# Patient Record
Sex: FEMALE | Race: WHITE | NOT HISPANIC OR LATINO | Employment: FULL TIME | URBAN - NONMETROPOLITAN AREA
[De-identification: names, ages, dates, MRNs, and addresses within clinical notes are randomized per-mention and may not be internally consistent; named-entity substitution may affect disease eponyms.]

---

## 2017-03-31 ENCOUNTER — OFFICE VISIT - GICH (OUTPATIENT)
Dept: FAMILY MEDICINE | Facility: OTHER | Age: 22
End: 2017-03-31

## 2017-03-31 ENCOUNTER — HISTORY (OUTPATIENT)
Dept: FAMILY MEDICINE | Facility: OTHER | Age: 22
End: 2017-03-31

## 2017-03-31 DIAGNOSIS — J32.9 CHRONIC SINUSITIS: ICD-10-CM

## 2017-03-31 DIAGNOSIS — J02.9 ACUTE PHARYNGITIS: ICD-10-CM

## 2017-03-31 DIAGNOSIS — B97.89 OTHER VIRAL AGENTS AS THE CAUSE OF DISEASES CLASSIFIED ELSEWHERE: ICD-10-CM

## 2017-03-31 DIAGNOSIS — J04.0 ACUTE LARYNGITIS: ICD-10-CM

## 2017-03-31 LAB — STREP A ANTIGEN - HISTORICAL: NEGATIVE

## 2017-06-02 ENCOUNTER — OFFICE VISIT - GICH (OUTPATIENT)
Dept: FAMILY MEDICINE | Facility: OTHER | Age: 22
End: 2017-06-02

## 2017-06-02 ENCOUNTER — HISTORY (OUTPATIENT)
Dept: FAMILY MEDICINE | Facility: OTHER | Age: 22
End: 2017-06-02

## 2017-06-02 DIAGNOSIS — R30.0 DYSURIA: ICD-10-CM

## 2017-06-02 DIAGNOSIS — L29.89 OTHER PRURITUS: ICD-10-CM

## 2017-06-02 DIAGNOSIS — J02.9 ACUTE PHARYNGITIS: ICD-10-CM

## 2017-06-02 DIAGNOSIS — N30.00 ACUTE CYSTITIS WITHOUT HEMATURIA: ICD-10-CM

## 2017-06-02 LAB
BACTERIA URINE: ABNORMAL BACTERIA/HPF
BILIRUB UR QL: NEGATIVE
CLARITY, URINE: CLEAR CLARITY
COLOR UR: YELLOW COLOR
EPITHELIAL CELLS: ABNORMAL EPI/HPF
GLUCOSE URINE: NEGATIVE MG/DL
KETONES UR QL: NEGATIVE MG/DL
LEUKOCYTE ESTERASE URINE: ABNORMAL
NITRITE UR QL STRIP: NEGATIVE
OCCULT BLOOD,URINE - HISTORICAL: ABNORMAL
PH UR: 6 [PH]
PROTEIN QUALITATIVE,URINE - HISTORICAL: NEGATIVE MG/DL
RBC - HISTORICAL: ABNORMAL /HPF
SP GR UR STRIP: 1.02
STREP A ANTIGEN - HISTORICAL: NEGATIVE
UROBILINOGEN,QUALITATIVE - HISTORICAL: NORMAL EU/DL
WBC - HISTORICAL: ABNORMAL /HPF

## 2017-12-28 NOTE — PATIENT INSTRUCTIONS
Patient Information     Patient Name MRRoseline Roberts [id number] Female 1995      Patient Instructions by Aure Dinh NP at 2017  1:15 PM     Author:  Aure Dinh NP Service:  (none) Author Type:  PHYS- Nurse Practitioner     Filed:  2017  1:46 PM Encounter Date:  2017 Status:  Signed     :  Aure Dinh NP (PHYS- Nurse Practitioner)            Strep test is negative-symptoms are likely cold related  No yeast or bacterial infection  Will call with STD testing    Bactrim twice daily for 3 days  Antibiotics per order.  Drink plenty of fluids.   Return to clinic if any fevers, vomiting, or flank pain develops as this may be a sign the infection has moved to your kidney's.  We have initiated a urine culture. If any changes are needed in your antibiotics, we will notify you when the results have returned.

## 2017-12-28 NOTE — PROGRESS NOTES
Patient Information     Patient Name MRN Roseline Najera [id number] Female 1995      Progress Notes by Aure Dinh NP at 2017  1:15 PM     Author:  Aure Dinh NP Service:  (none) Author Type:  PHYS- Nurse Practitioner     Filed:  2017  3:15 PM Encounter Date:  2017 Status:  Signed     :  Aure Dinh NP (PHYS- Nurse Practitioner)            HPI:    Roseline Brady is a 21 y.o. female who presents to clinic today for sore throat. Has had throat pain, trouble swallowing at times. Today noted some red dots on top of her throat. Overall sx present for 5 days. No fevers. She has mild cough and runny nose. No headaches. She is not taking anything for sx. Able to swallow spit and drink fluids/eat foods.     She has had urinary frequency, burning with urination for the past 2 weeks. Having some vaginal itching as well. No changes in vaginal discharge. She is sexually active, never tested for STD's. She has had 1 partner, male in past 6 months. She is on depo and they use condoms.     No past medical history on file.  No past surgical history on file.  Social History     Substance Use Topics       Smoking status: Never Smoker     Smokeless tobacco: Never Used     Alcohol use No     Current Outpatient Prescriptions       Medication  Sig Dispense Refill     fluticasone (50 mcg per actuation) nasal solution (FLONASE) Inhale 1 Spray into both nostrils once daily. 1 Bottle 0     No current facility-administered medications for this visit.      Medications have been reviewed by me and are current to the best of my knowledge and ability.    Allergies     Allergen  Reactions     Hydrocodone Nausea Only     Lactose *Unknown     Ondansetron Nausea Only       ROS:  Pertinent positives and negatives are noted in HPI.    EXAM:  General appearance: well appearing female, in no acute distress  Head: normocephalic, atraumatic  Ears: TM's with cone of light, no erythema, canals clear  bilaterally  Eyes: conjunctivae normal  Orophayrnx: moist mucous membranes, posterior pharynx without erythema, exudates or petechiae, no post nasal drip seen  Neck: supple without adenopathy  Respiratory: clear to auscultation bilaterally  Cardiac: RRR with no murmurs  Genitourinary Exam Female: External genitalia, vulva and vagina appear normal. Speculum exam reveals normal exam. Bimanual exam reveals normal uterus and adnexa with no masses, nontender urethra and bladder.   Psychological: normal affect, alert and pleasant  Lab:   Results for orders placed or performed in visit on 06/02/17      RAPID STREP WITH REFLEX CULTURE      Result  Value Ref Range    STREP A ANTIGEN           Negative Negative   URINALYSIS W REFLEX MICROSCOPIC IF POSITIVE      Result  Value Ref Range    COLOR                     Yellow Yellow Color    CLARITY                   Clear Clear Clarity    SPECIFIC GRAVITY,URINE    1.025 1.010, 1.015, 1.020, 1.025                    PH,URINE                  6.0 6.0, 7.0, 8.0, 5.5, 6.5, 7.5, 8.5                    UROBILINOGEN,QUALITATIVE  Normal Normal EU/dl    PROTEIN, URINE Negative Negative mg/dL    GLUCOSE, URINE Negative Negative mg/dL    KETONES,URINE             Negative Negative mg/dL    BILIRUBIN,URINE           Negative Negative                    OCCULT BLOOD,URINE        Trace (A) Negative                    NITRITE                   Negative Negative                    LEUKOCYTE ESTERASE        Small (A) Negative                   URINALYSIS MICROSCOPIC      Result  Value Ref Range    RBC 0-2 0-2, None Seen /HPF    WBC 6-10 (A) 0-2, 3-5, None Seen /HPF    BACTERIA                  Moderate (A) None Seen, Rare, Occasional, Few Bacteria/HPF    EPITHELIAL CELLS          Few None Seen, Few Epi/HPF   GC CHLAMYDIA TRACH PROBE      Result  Value Ref Range    CHLAMYDIA PCR NOT Detected NOT Detected, Invalid    N GONORRHOEAE PCR NOT Detected NOT Detected, Invalid   WET PREP GENITAL      Result   Value Ref Range    TRICHOMONAS               None Seen None Seen    YEAST                     None Seen None Seen    CLUE CELLS                None Seen None Seen         ASSESSMENT/PLAN:    ICD-10-CM    1. Acute cystitis without hematuria N30.00 trimethoprim-sulfamethoxazole, 160-800 mg, (BACTRIM DS, SEPTRA DS) tablet      URINE CULTURE   2. Sore throat J02.9 RAPID STREP WITH REFLEX CULTURE      RAPID STREP WITH REFLEX CULTURE   3. Dysuria R30.0 URINALYSIS W REFLEX MICROSCOPIC IF POSITIVE      GC CHLAMYDIA TRACH PROBE      URINALYSIS W REFLEX MICROSCOPIC IF POSITIVE      URINALYSIS MICROSCOPIC      URINALYSIS MICROSCOPIC      GC CHLAMYDIA TRACH PROBE   4. Vaginal itching L29.8 WET PREP GENITAL      WET PREP GENITAL   UA shows bacteria and RBC/WBC. Tx with bactrim and UC initiated. Reviewed need to complete all antibiotics. Discussed typical course of illness, symptomatic treatment and when to return to clinic. Patient in agreement with plan and all questions were answered.     RST negative. Sx likely viral URI. Symptoms likely due to virus. No antibiotic is needed at this time. Symptoms typically worse on days 3-4 and then begin improving each day. If symptoms begin worsening or fail to improve after 10-14 days, return to clinic for reevaluation. All questions were answered and she is in agreement with plan.         Patient Instructions   Strep test is negative-symptoms are likely cold related  No yeast or bacterial infection  Will call with STD testing    Bactrim twice daily for 3 days  Antibiotics per order.  Drink plenty of fluids.   Return to clinic if any fevers, vomiting, or flank pain develops as this may be a sign the infection has moved to your kidney's.  We have initiated a urine culture. If any changes are needed in your antibiotics, we will notify you when the results have returned.

## 2017-12-30 NOTE — NURSING NOTE
Patient Information     Patient Name MRN Roseline Najera [id number] Female 1995      Nursing Note by Brittany Leblanc at 2017  1:15 PM     Author:  Brittany Leblanc Service:  (none) Author Type:  (none)     Filed:  2017  1:07 PM Encounter Date:  2017 Status:  Signed     :  Brittany Leblanc            Patient presents today with a sore throat. Patient stating that the back of her tongue has had little bumps she describes as red.  States very hard to swallow. Pain rated at 10.   Brittany Leblanc LPN..............2017 12:58 PM

## 2018-01-04 NOTE — PATIENT INSTRUCTIONS
Patient Information     Patient Name MRRoseline Roberts [id number] Female 1995      Patient Instructions by Aure Dinh NP at 3/31/2017  4:17 PM     Author:  Aure Dinh NP  Service:  (none) Author Type:  PHYS- Nurse Practitioner     Filed:  3/31/2017  4:17 PM  Encounter Date:  3/31/2017 Status:  Addendum     :  Aure Dinh NP (PHYS- Nurse Practitioner)        Related Notes: Original Note by Aure Dinh NP (PHYS- Nurse Practitioner) filed at 3/31/2017  4:17 PM               Index Citizen of the [place]   Laryngitis   ________________________________________________________________________  KEY POINTS    Laryngitis is irritation and swelling of your vocal cords and voice box (larynx). It causes hoarseness, which means your voice sounds unnaturally low, deep, or raspy. Sometimes you can only whisper or hardly speak at all.    If another health problem is causing the laryngitis, treatment for that problem will also treat the laryngitis. The main treatment is resting your voice as much as you can.    Follow the full course of treatment prescribed by your healthcare provider. Ask your healthcare provider if there are activities you should avoid and when you can return to your normal activities.  ________________________________________________________________________  What is laryngitis?   Laryngitis is irritation and swelling of your vocal cords and voice box (larynx). It causes hoarseness, which means your voice sounds unnaturally low, deep, or raspy. Sometimes you can only whisper or hardly speak at all.  Laryngitis may be acute or chronic. Acute laryngitis starts suddenly and lasts no more than a few days. Laryngitis is chronic if symptoms last for at least 3 weeks.  What is the cause?  Acute laryngitis is usually a symptom of a cold, flu, bronchitis, sinusitis, and other infections or allergies. Chronic laryngitis can be caused by:    Heavy smoking or drinking    Overuse of your voice, such as  in teaching or public speaking, singing or shouting    Coughing hard    Exposure to dust, chemicals, or cigarette smoke  Health problems that can cause changes in your vocal cords include:    Thyroid disease    Noncancerous growths on the vocal cords    Acid reflux from the stomach    Cancer of the vocal cords  What are the symptoms?   Symptoms may include:    Low, raspy voice and hoarseness    A dry cough (meaning that you don t cough up mucus)    A throat that feels dry or sore    A voice that weakens throughout the day  You may lose your voice completely and only be able to whisper.  How is it diagnosed?   Your healthcare provider will ask about your symptoms and medical history and examine you. To check for possible causes of your symptoms, you may have tests such as:     Laryngoscopy, in which a slim, flexible, lighted tube is passed through your mouth to look at your vocal cords    A biopsy, which is the removal of a small sample of tissue for testing    CT scan, which uses X-rays and a computer to show detailed pictures of the throat    MRI, which uses a strong magnetic field and radio waves to show detailed pictures of the throat  How is it treated?  If another health problem is causing the laryngitis, such as thyroid disease, acid reflux, or sinusitis, treatment for that problem will also treat the laryngitis. If no health problem has caused laryngitis, the main treatment is resting your voice as much as you can. Symptoms should improve in a few days or a couple of weeks.  Your healthcare provider may recommend using a steroid spray to help the larynx (voice box) heal faster. Using a steroid for a long time can have serious side effects. Take steroid medicine exactly as your healthcare provider prescribes. Don t take more or less of it than prescribed by your provider and don t take it longer than prescribed. Don t stop taking a steroid without your provider's approval. You may have to lower your dosage  slowly before stopping it.  How can I take care of myself?   Follow the full course of treatment prescribed by your healthcare provider. In addition    Don t smoke, and stay away from others who are smoking.    Avoid breathing dust and chemical fumes.    Rest your voice as much as possible.    Drink extra fluids, such as water, fruit juice, and tea.    Use a humidifier to put more moisture in the air. Avoid steam vaporizers because they can cause burns. Be sure to keep the humidifier clean, as recommended in the 's instructions. It's important to keep bacteria and mold from growing in the water container.  Ask your healthcare provider:    How and when you will get your test results    How long it will take to recover    If there are activities you should avoid and when you can return to your normal activities    How to take care of yourself at home    What symptoms or problems you should watch for and what to do if you have them  Make sure you know when you should come back for a checkup. Keep all appointments for provider visits or tests.  How can I help prevent laryngitis?     Get plenty of rest when you have a viral or bacterial infection, such as a cold or sinusitis.    Avoid vocal strain by not yelling, screaming, or talking loudly, especially when you have a cold or other throat or sinus infection.    If you smoke, try to quit. Talk to your healthcare provider about ways to quit smoking.    Avoid secondhand smoke.    If you want to drink alcohol, ask your healthcare provider how much is safe for you to drink.    If you have frequent heartburn or reflux disease, see your healthcare provider about preventing or treating these problems.  Developed by [employer].  Adult Advisor 2016.3 published by [employer].  Last modified: 2016-05-12  Last reviewed: 2014-09-04  This content is reviewed periodically and is subject to change as new health information becomes available. The information is intended to  inform and educate and is not a replacement for medical evaluation, advice, diagnosis or treatment by a healthcare professional.  References   Adult Advisor 2016.3 Index    Copyright   2016 [employer], a division of [employer] All rights reserved.            Index [place] Related topics   Sinusitis   ________________________________________________________________________  KEY POINTS    Sinusitis is swelling and irritation of the linings of the sinuses, the hollow spaces in the bones of your face and front of your skull.    You may need to take medicine for your stuffy nose, pain, infection, or swelling.    Use saline nasal sprays or rinses to help wash out nasal passages if you have a sinus infection. A humidifier can add moisture to the air also.  ________________________________________________________________________  What is sinusitis?  Sinusitis is swelling and irritation of the linings of the sinuses. The sinuses are hollow spaces in the bones of your face and front of your skull. They connect with the nose through small openings. Like the nose, they are lined with tissue (membranes) that make mucus. Mucus drains through the small openings to the nose.  What is the cause?  The passageways from the sinuses to the nose are very narrow. When drainage of mucus from the sinuses is blocked, the sinuses get swollen and irritated. They may also become infected with bacteria, a virus, or even fungus. Allergies or irritation from pollen, mold, dust, or smoke can also cause swelling of the sinuses. Sometimes a tooth infection spreads to the sinuses.  You may be more likely to get sinus congestion and infections if you have:    Severe or untreated seasonal or year-round allergies    Injured the bones in your nose    A deformity of the nose that causes the sinuses not to drain properly    Small growths called polyps in the sinuses that partially block the sinus openings  What are the symptoms?  Symptoms  may include:    Feeling of fullness or pressure in your face or head    A headache that is most painful when you first wake up in the morning or when you bend over and put your head down    Pain in your face    Aching in the upper jaw and teeth    Runny or stuffy nose    Cough, especially at night    Fluid draining down the back of your throat (postnasal drip)    Sore throat, especially in the morning or evening  How is it diagnosed?  Your healthcare provider will ask about your symptoms and medical history and examine you. Tests are often not needed but may include:    X-ray of your sinuses    CT scan, which uses X-rays and a computer to show detailed pictures of the sinuses  How is it treated?  Several kinds of medicine may help:    Nonprescription pain medicine, such as acetaminophen, ibuprofen, or naproxen. Read the label and take as directed. Unless recommended by your healthcare provider, you should not take these medicines for more than 10 days.    Nonsteroidal anti-inflammatory medicines (NSAIDs), such as ibuprofen, naproxen, and aspirin, may cause stomach bleeding and other problems. These risks increase with age.    Acetaminophen may cause liver damage or other problems. Unless recommended by your provider, don't take more than 3000 milligrams (mg) in 24 hours. To make sure you don t take too much, check other medicines you take to see if they also contain acetaminophen. Ask your provider if you need to avoid drinking alcohol while taking this medicine.    Decongestants pills or nasal sprays to reduce swelling in your nose and sinuses and lessen the amount of mucus. Use decongestants as directed. If you are using a nonprescription nasal-spray decongestant, generally you should not use it for more than 3 days. After 3 days it may make your symptoms worse. Ask your healthcare provider if it is OK for you to use a nasal spray decongestant longer than this.    Antihistamine tablets or a nasal spray to treat  the allergies during your allergy season or, in some cases, year-round. Antihistamines block the effect of a chemical your body makes when you have an allergic reaction.    Antibiotics, if your provider thinks you might have a sinus infection    Steroid nasal spray if your provider thinks it may help clear your sinuses  If sinusitis is still a problem despite treatment, you may be referred to an allergy specialist or an ear, nose, and throat (ENT) specialist. The allergy specialist will check for and help treat your allergies to lessen the chance of having sinusitis. The ENT specialist will check for polyps or a deformed bone that may be blocking your sinuses. You may need surgery to create an extra or enlarged passageway to help your sinuses drain more easily.  Depending on what caused the sinusitis and how severe it is, it may last for days or weeks. For most cases of sinusitis, the symptoms get better gradually over 3 to 10 days.  How can I take care of myself?  Follow the full course of treatment prescribed by your healthcare provider. In addition:    If you are taking an antibiotic, take all of it as directed by your provider. If you stop taking the medicine when your symptoms are gone but before you have taken all of the medicine, symptoms may come back.    Don t smoke, and stay away from others who are smoking.    If you have allergies, try to avoid the things you are allergic to, like animal dander. Use medicine to keep your nose and sinuses open.    Use a humidifier to put more moisture in the air. This can help to open blocked sinuses and relieve pain. Avoid steam vaporizers because they can cause burns. Be sure to keep the humidifier clean, as recommended in the 's instructions. It's important to keep bacteria and mold from growing in the water container.    Use saline nasal sprays or rinses to help wash out nasal passages if you have a sinus infection. You may use the sprays also to prevent  infections.    Get plenty of rest.    Drink more fluids to keep the mucus as thin as possible so your sinuses can drain more easily.    Raise the head of your bed slightly or sleep on extra pillows to help your sinuses drain.    Put warm, moist cloths on painful areas.  Ask your healthcare provider:    How and when you will get your test results    How long it will take to recover    If there are activities you should avoid and when you can return to your normal activities    How to take care of yourself at home    What symptoms or problems you should watch for and what to do if you have them  Make sure you know when you should come back for a checkup. Keep all appointments for provider visits or tests.  How can I help prevent sinusitis?    Treat your colds and allergies promptly. Use decongestants as soon as you start having symptoms, and before you fly, travel to high altitudes, or swim in deep water.    If you smoke, try to quit. Talk to your healthcare provider about ways to quit smoking.  Developed by [employer].  Adult Advisor 2016.3 published by [employer].  Last modified: 2016-03-23  Last reviewed: 2015-08-27  This content is reviewed periodically and is subject to change as new health information becomes available. The information is intended to inform and educate and is not a replacement for medical evaluation, advice, diagnosis or treatment by a healthcare professional.  References   Adult Advisor 2016.3 Index    Copyright   2016 [employer], a division of [employer] All rights reserved.

## 2018-01-04 NOTE — NURSING NOTE
Patient Information     Patient Name MRN Roseline Najera [id number] Female 1995      Nursing Note by Gosselin, Norma J at 3/31/2017  5:15 PM     Author:  Gosselin, Norma J Service:  (none) Author Type:  (none)     Filed:  3/31/2017  4:11 PM Encounter Date:  3/31/2017 Status:  Signed     :  Gosselin, Norma J            Sinus congestion, cough, sore throat, laryngitis x 1 week.  Norma J Gosselin LPN....................  3/31/2017   3:57 PM

## 2018-01-04 NOTE — PROGRESS NOTES
Patient Information     Patient Name MRN Roseline Najera [id number] Female 1995      Progress Notes by Aure Dinh NP at 3/31/2017  5:15 PM     Author:  Aure Dinh NP Service:  (none) Author Type:  PHYS- Nurse Practitioner     Filed:  3/31/2017  4:54 PM Encounter Date:  3/31/2017 Status:  Signed     :  Aure Dinh NP (PHYS- Nurse Practitioner)            HPI:    Roseline Brady is a 21 y.o. female who presents to clinic today for laryngitis. Sx started on . Feels sx have worsened some. Having sinus congestion, headaches, started 3-4 days ago. No fevers. She has pain in throat. She has been taking Sudafed and Benadryl for sx per her student health services at school. No close contacts with similar sx.     No past medical history on file.  No past surgical history on file.  Social History     Substance Use Topics       Smoking status: Never Smoker     Smokeless tobacco: Never Used     Alcohol use No     Current Outpatient Prescriptions       Medication  Sig Dispense Refill     norgestimate-ethinyl estradiol (ORTHO TRI-CYCLEN LO, 28,) 0.18/0.215/0.25-25 mg-mcg tablet Take 1 tablet by mouth once daily.       No current facility-administered medications for this visit.      Medications have been reviewed by me and are current to the best of my knowledge and ability.    No Known Allergies    ROS:  Pertinent positives and negatives are noted in HPI.    EXAM:  General appearance: well appearing female, in no acute distress  Head: normocephalic, atraumatic  Ears: TM's with cone of light, no erythema, canals clear bilaterally  Eyes: conjunctivae normal  Orophayrnx: moist mucous membranes, tonsils without erythema, exudates or petechiae, post nasal drip seen, hoarse voice  Neck: supple without adenopathy  Respiratory: clear to auscultation bilaterally  Cardiac: RRR with no murmurs  Psychological: normal affect, alert and pleasant  Lab:   Results for orders placed or performed in visit  on 03/31/17      THROAT RAPID STREP A WITH REFLEX      Result  Value Ref Range    STREP A ANTIGEN           Negative Negative         ASSESSMENT/PLAN:    ICD-10-CM    1. Viral sinusitis J32.9 fluticasone (50 mcg per actuation) nasal solution (FLONASE)     B97.89    2. Sore throat J02.9 THROAT RAPID STREP A WITH REFLEX      THROAT RAPID STREP A WITH REFLEX   3. Laryngitis J04.0    RST negative. Sinusitis likely viral. Discussed sx management and flonase for sinus sx. Symptoms likely due to virus. No antibiotic is needed at this time. Symptoms typically worse on days 3-4 and then begin improving each day. If symptoms begin worsening or fail to improve after 10 days, return to clinic for reevaluation. All questions were answered and she is in agreement with plan.         Patient Instructions      Index Polish   Laryngitis   ________________________________________________________________________  KEY POINTS    Laryngitis is irritation and swelling of your vocal cords and voice box (larynx). It causes hoarseness, which means your voice sounds unnaturally low, deep, or raspy. Sometimes you can only whisper or hardly speak at all.    If another health problem is causing the laryngitis, treatment for that problem will also treat the laryngitis. The main treatment is resting your voice as much as you can.    Follow the full course of treatment prescribed by your healthcare provider. Ask your healthcare provider if there are activities you should avoid and when you can return to your normal activities.  ________________________________________________________________________  What is laryngitis?   Laryngitis is irritation and swelling of your vocal cords and voice box (larynx). It causes hoarseness, which means your voice sounds unnaturally low, deep, or raspy. Sometimes you can only whisper or hardly speak at all.  Laryngitis may be acute or chronic. Acute laryngitis starts suddenly and lasts no more than a few days.  Laryngitis is chronic if symptoms last for at least 3 weeks.  What is the cause?  Acute laryngitis is usually a symptom of a cold, flu, bronchitis, sinusitis, and other infections or allergies. Chronic laryngitis can be caused by:    Heavy smoking or drinking    Overuse of your voice, such as in teaching or public speaking, singing or shouting    Coughing hard    Exposure to dust, chemicals, or cigarette smoke  Health problems that can cause changes in your vocal cords include:    Thyroid disease    Noncancerous growths on the vocal cords    Acid reflux from the stomach    Cancer of the vocal cords  What are the symptoms?   Symptoms may include:    Low, raspy voice and hoarseness    A dry cough (meaning that you don t cough up mucus)    A throat that feels dry or sore    A voice that weakens throughout the day  You may lose your voice completely and only be able to whisper.  How is it diagnosed?   Your healthcare provider will ask about your symptoms and medical history and examine you. To check for possible causes of your symptoms, you may have tests such as:     Laryngoscopy, in which a slim, flexible, lighted tube is passed through your mouth to look at your vocal cords    A biopsy, which is the removal of a small sample of tissue for testing    CT scan, which uses X-rays and a computer to show detailed pictures of the throat    MRI, which uses a strong magnetic field and radio waves to show detailed pictures of the throat  How is it treated?  If another health problem is causing the laryngitis, such as thyroid disease, acid reflux, or sinusitis, treatment for that problem will also treat the laryngitis. If no health problem has caused laryngitis, the main treatment is resting your voice as much as you can. Symptoms should improve in a few days or a couple of weeks.  Your healthcare provider may recommend using a steroid spray to help the larynx (voice box) heal faster. Using a steroid for a long time can have  serious side effects. Take steroid medicine exactly as your healthcare provider prescribes. Don t take more or less of it than prescribed by your provider and don t take it longer than prescribed. Don t stop taking a steroid without your provider's approval. You may have to lower your dosage slowly before stopping it.  How can I take care of myself?   Follow the full course of treatment prescribed by your healthcare provider. In addition    Don t smoke, and stay away from others who are smoking.    Avoid breathing dust and chemical fumes.    Rest your voice as much as possible.    Drink extra fluids, such as water, fruit juice, and tea.    Use a humidifier to put more moisture in the air. Avoid steam vaporizers because they can cause burns. Be sure to keep the humidifier clean, as recommended in the 's instructions. It's important to keep bacteria and mold from growing in the water container.  Ask your healthcare provider:    How and when you will get your test results    How long it will take to recover    If there are activities you should avoid and when you can return to your normal activities    How to take care of yourself at home    What symptoms or problems you should watch for and what to do if you have them  Make sure you know when you should come back for a checkup. Keep all appointments for provider visits or tests.  How can I help prevent laryngitis?     Get plenty of rest when you have a viral or bacterial infection, such as a cold or sinusitis.    Avoid vocal strain by not yelling, screaming, or talking loudly, especially when you have a cold or other throat or sinus infection.    If you smoke, try to quit. Talk to your healthcare provider about ways to quit smoking.    Avoid secondhand smoke.    If you want to drink alcohol, ask your healthcare provider how much is safe for you to drink.    If you have frequent heartburn or reflux disease, see your healthcare provider about preventing or  treating these problems.  Developed by [employer].  Adult Advisor 2016.3 published by [employer].  Last modified: 2016-05-12  Last reviewed: 2014-09-04  This content is reviewed periodically and is subject to change as new health information becomes available. The information is intended to inform and educate and is not a replacement for medical evaluation, advice, diagnosis or treatment by a healthcare professional.  References   Adult Advisor 2016.3 Index    Copyright   2016 [employer], a division of [employer] All rights reserved.            Index Turkmen Related topics   Sinusitis   ________________________________________________________________________  KEY POINTS    Sinusitis is swelling and irritation of the linings of the sinuses, the hollow spaces in the bones of your face and front of your skull.    You may need to take medicine for your stuffy nose, pain, infection, or swelling.    Use saline nasal sprays or rinses to help wash out nasal passages if you have a sinus infection. A humidifier can add moisture to the air also.  ________________________________________________________________________  What is sinusitis?  Sinusitis is swelling and irritation of the linings of the sinuses. The sinuses are hollow spaces in the bones of your face and front of your skull. They connect with the nose through small openings. Like the nose, they are lined with tissue (membranes) that make mucus. Mucus drains through the small openings to the nose.  What is the cause?  The passageways from the sinuses to the nose are very narrow. When drainage of mucus from the sinuses is blocked, the sinuses get swollen and irritated. They may also become infected with bacteria, a virus, or even fungus. Allergies or irritation from pollen, mold, dust, or smoke can also cause swelling of the sinuses. Sometimes a tooth infection spreads to the sinuses.  You may be more likely to get sinus congestion and infections if  you have:    Severe or untreated seasonal or year-round allergies    Injured the bones in your nose    A deformity of the nose that causes the sinuses not to drain properly    Small growths called polyps in the sinuses that partially block the sinus openings  What are the symptoms?  Symptoms may include:    Feeling of fullness or pressure in your face or head    A headache that is most painful when you first wake up in the morning or when you bend over and put your head down    Pain in your face    Aching in the upper jaw and teeth    Runny or stuffy nose    Cough, especially at night    Fluid draining down the back of your throat (postnasal drip)    Sore throat, especially in the morning or evening  How is it diagnosed?  Your healthcare provider will ask about your symptoms and medical history and examine you. Tests are often not needed but may include:    X-ray of your sinuses    CT scan, which uses X-rays and a computer to show detailed pictures of the sinuses  How is it treated?  Several kinds of medicine may help:    Nonprescription pain medicine, such as acetaminophen, ibuprofen, or naproxen. Read the label and take as directed. Unless recommended by your healthcare provider, you should not take these medicines for more than 10 days.    Nonsteroidal anti-inflammatory medicines (NSAIDs), such as ibuprofen, naproxen, and aspirin, may cause stomach bleeding and other problems. These risks increase with age.    Acetaminophen may cause liver damage or other problems. Unless recommended by your provider, don't take more than 3000 milligrams (mg) in 24 hours. To make sure you don t take too much, check other medicines you take to see if they also contain acetaminophen. Ask your provider if you need to avoid drinking alcohol while taking this medicine.    Decongestants pills or nasal sprays to reduce swelling in your nose and sinuses and lessen the amount of mucus. Use decongestants as directed. If you are using a  nonprescription nasal-spray decongestant, generally you should not use it for more than 3 days. After 3 days it may make your symptoms worse. Ask your healthcare provider if it is OK for you to use a nasal spray decongestant longer than this.    Antihistamine tablets or a nasal spray to treat the allergies during your allergy season or, in some cases, year-round. Antihistamines block the effect of a chemical your body makes when you have an allergic reaction.    Antibiotics, if your provider thinks you might have a sinus infection    Steroid nasal spray if your provider thinks it may help clear your sinuses  If sinusitis is still a problem despite treatment, you may be referred to an allergy specialist or an ear, nose, and throat (ENT) specialist. The allergy specialist will check for and help treat your allergies to lessen the chance of having sinusitis. The ENT specialist will check for polyps or a deformed bone that may be blocking your sinuses. You may need surgery to create an extra or enlarged passageway to help your sinuses drain more easily.  Depending on what caused the sinusitis and how severe it is, it may last for days or weeks. For most cases of sinusitis, the symptoms get better gradually over 3 to 10 days.  How can I take care of myself?  Follow the full course of treatment prescribed by your healthcare provider. In addition:    If you are taking an antibiotic, take all of it as directed by your provider. If you stop taking the medicine when your symptoms are gone but before you have taken all of the medicine, symptoms may come back.    Don t smoke, and stay away from others who are smoking.    If you have allergies, try to avoid the things you are allergic to, like animal dander. Use medicine to keep your nose and sinuses open.    Use a humidifier to put more moisture in the air. This can help to open blocked sinuses and relieve pain. Avoid steam vaporizers because they can cause burns. Be sure to  keep the humidifier clean, as recommended in the 's instructions. It's important to keep bacteria and mold from growing in the water container.    Use saline nasal sprays or rinses to help wash out nasal passages if you have a sinus infection. You may use the sprays also to prevent infections.    Get plenty of rest.    Drink more fluids to keep the mucus as thin as possible so your sinuses can drain more easily.    Raise the head of your bed slightly or sleep on extra pillows to help your sinuses drain.    Put warm, moist cloths on painful areas.  Ask your healthcare provider:    How and when you will get your test results    How long it will take to recover    If there are activities you should avoid and when you can return to your normal activities    How to take care of yourself at home    What symptoms or problems you should watch for and what to do if you have them  Make sure you know when you should come back for a checkup. Keep all appointments for provider visits or tests.  How can I help prevent sinusitis?    Treat your colds and allergies promptly. Use decongestants as soon as you start having symptoms, and before you fly, travel to high altitudes, or swim in deep water.    If you smoke, try to quit. Talk to your healthcare provider about ways to quit smoking.  Developed by "[employer]".  Adult Advisor 2016.3 published by "[employer]".  Last modified: 2016-03-23  Last reviewed: 2015-08-27  This content is reviewed periodically and is subject to change as new health information becomes available. The information is intended to inform and educate and is not a replacement for medical evaluation, advice, diagnosis or treatment by a healthcare professional.  References   Adult Advisor 2016.3 Index    Copyright   2016 "[employer]", a division of [employer] All rights reserved.

## 2018-01-26 VITALS
HEIGHT: 64 IN | HEART RATE: 72 BPM | TEMPERATURE: 98.9 F | BODY MASS INDEX: 21.68 KG/M2 | WEIGHT: 127 LBS | DIASTOLIC BLOOD PRESSURE: 70 MMHG | SYSTOLIC BLOOD PRESSURE: 100 MMHG

## 2018-01-26 VITALS
SYSTOLIC BLOOD PRESSURE: 118 MMHG | BODY MASS INDEX: 21.51 KG/M2 | HEART RATE: 88 BPM | DIASTOLIC BLOOD PRESSURE: 68 MMHG | HEIGHT: 64 IN | WEIGHT: 126 LBS | TEMPERATURE: 99.7 F

## 2018-02-08 ENCOUNTER — DOCUMENTATION ONLY (OUTPATIENT)
Dept: FAMILY MEDICINE | Facility: OTHER | Age: 23
End: 2018-02-08

## 2021-03-14 ENCOUNTER — NURSE TRIAGE (OUTPATIENT)
Dept: NURSING | Facility: CLINIC | Age: 26
End: 2021-03-14

## 2021-03-14 ENCOUNTER — OFFICE VISIT (OUTPATIENT)
Dept: FAMILY MEDICINE | Facility: OTHER | Age: 26
End: 2021-03-14
Attending: NURSE PRACTITIONER
Payer: COMMERCIAL

## 2021-03-14 VITALS
RESPIRATION RATE: 14 BRPM | OXYGEN SATURATION: 99 % | SYSTOLIC BLOOD PRESSURE: 122 MMHG | HEART RATE: 102 BPM | DIASTOLIC BLOOD PRESSURE: 74 MMHG | WEIGHT: 144.5 LBS | BODY MASS INDEX: 24.67 KG/M2 | HEIGHT: 64 IN | TEMPERATURE: 102.9 F

## 2021-03-14 DIAGNOSIS — R50.9 FEVER IN ADULT: ICD-10-CM

## 2021-03-14 DIAGNOSIS — J02.9 VIRAL PHARYNGITIS: Primary | ICD-10-CM

## 2021-03-14 DIAGNOSIS — J02.9 SORE THROAT: ICD-10-CM

## 2021-03-14 LAB
SPECIMEN SOURCE: NORMAL
STREP GROUP A PCR: NOT DETECTED

## 2021-03-14 PROCEDURE — 87651 STREP A DNA AMP PROBE: CPT | Mod: ZL | Performed by: NURSE PRACTITIONER

## 2021-03-14 PROCEDURE — C9803 HOPD COVID-19 SPEC COLLECT: HCPCS

## 2021-03-14 PROCEDURE — U0003 INFECTIOUS AGENT DETECTION BY NUCLEIC ACID (DNA OR RNA); SEVERE ACUTE RESPIRATORY SYNDROME CORONAVIRUS 2 (SARS-COV-2) (CORONAVIRUS DISEASE [COVID-19]), AMPLIFIED PROBE TECHNIQUE, MAKING USE OF HIGH THROUGHPUT TECHNOLOGIES AS DESCRIBED BY CMS-2020-01-R: HCPCS | Mod: ZL | Performed by: NURSE PRACTITIONER

## 2021-03-14 PROCEDURE — U0005 INFEC AGEN DETEC AMPLI PROBE: HCPCS | Mod: ZL | Performed by: NURSE PRACTITIONER

## 2021-03-14 PROCEDURE — 99203 OFFICE O/P NEW LOW 30 MIN: CPT | Performed by: NURSE PRACTITIONER

## 2021-03-14 ASSESSMENT — MIFFLIN-ST. JEOR: SCORE: 1395.45

## 2021-03-14 ASSESSMENT — PAIN SCALES - GENERAL: PAINLEVEL: SEVERE PAIN (6)

## 2021-03-14 NOTE — LETTER
[facility]  [address]  [place] [address]  Phone: [phone]  Fax: [phone]    March 14, 2021        Roseline Brady  [id number]    [place] [address]          To whom it may concern:    RE: Roseline NURIA DesaiJose Antonio    Patient was seen today at our clinic for sore throat and fever.  Patient was tested for strep and covid.  Patient is unable to work until test results are available and symptoms improving.    Please contact me for questions or concerns.      Sincerely,        Fabienne Patel NP

## 2021-03-14 NOTE — PROGRESS NOTES
"HPI:    Roseline Brady is a 25 year old female  who presents to [facility] today for sore throat and fever.    Sore throat started this morning.  Painful to swallow.  Bilateral throat pain.    Fever in clinic of 102.9, denies any known fever, chills, sweats, or feeling feverish.    Swollen and painful neck glands starting today.  States hx of reactive lymph nodes.  Body aches started today. No headaches.  No runny or stuffy nose.  No cough, chest tightness, heaviness or shortness of breath.  No nausea or vomiting.  No change in appetite.  Drinking lots of fluids.    Taking Ibuprofen, none today.  No known exposures to strep or covid.  She works as a teacher.    No previous covid testing  She has had both covid vaccines.        History reviewed. No pertinent past medical history.  History reviewed. No pertinent surgical history.  Social History     Tobacco Use     Smoking status: Never Smoker     Smokeless tobacco: Never Used   Substance Use Topics     Alcohol use: No     Current Outpatient Medications   Medication Sig Dispense Refill     levonorgestrel (MIRENA) 20 MCG/24HR IUD 1 Intra Uterine Device by Intrauterine route       Allergies   Allergen Reactions     Hydrocodone Nausea     Ondansetron Nausea     Seasonal Allergies Other (See Comments)     Lactose          Past medical history, past surgical history, current medications and allergies reviewed and accurate to the best of my knowledge.        ROS:  Refer to HPI    /74   Pulse 102   Temp 102.9  F (39.4  C) (Tympanic)   Resp 14   Ht 1.626 m (5' 4\")   Wt 65.5 kg (144 lb 8 oz)   SpO2 99%   Breastfeeding No   BMI 24.80 kg/m      EXAM:  General Appearance: Well appearing female adult, appropriate appearance for age. No acute distress  Ears: Left TM intact, translucent with bony landmarks appreciated, no erythema, no effusion, no bulging, no purulence.  Right TM intact, translucent with bony landmarks appreciated, no erythema, no effusion, no " bulging, no purulence.  Left auditory canal clear.  Right auditory canal clear.  Normal external ears, non tender.  Eyes: conjunctivae normal without erythema or irritation, corneas clear, no drainage or crusting, no eyelid swelling, pupils equal   Orophayrnx: moist mucous membranes, posterior pharynx with erythema, tonsillar pillars with erythema, tonsils surgically absent, no post nasal drip seen, no trismus, voice clear.    Nose:  No noted drainage or congestion   Neck: no tonsillar or anterior cervical lymph node enlargement.  Bilateral posterior cervical lymph node enlargement and tenderness to palpation.    Respiratory: normal chest wall and respirations.  Normal effort.  Clear to auscultation bilaterally, no wheezing, crackles or rhonchi.  No increased work of breathing.  No cough appreciated.  Cardiac: RRR with no murmurs  Musculoskeletal:  Equal movement of bilateral upper extremities.  Equal movement of bilateral lower extremities.  Normal gait.    Psychological: normal affect, alert, oriented, and pleasant.       Labs:  Results for orders placed or performed in visit on 03/14/21   Group A Streptococcus PCR Throat Swab     Status: None    Specimen: Throat   Result Value Ref Range    Specimen Description Throat     Strep Group A PCR Not Detected NDET^Not Detected               ASSESSMENT/PLAN:    I have reviewed the nursing notes.  I have reviewed the findings, diagnosis, plan and need for follow up with the patient.    1. Sore throat    - Group A Streptococcus PCR Throat Swab  - Symptomatic COVID-19 Virus (Coronavirus) by PCR    2. Fever in adult    - Symptomatic COVID-19 Virus (Coronavirus) by PCR    May use over-the-counter Tylenol or ibuprofen PRN    3. Viral pharyngitis    Discussed with patient viral vs bacterial respiratory illness, and evidence based practice and guidelines for treatment with antibiotics.    Discussed with patient that symptoms and exam are consistent with viral illness.       Negative strep PCR test  covid test pending     Symptomatic treatment - Encouraged fluids, salt water gargles, honey, elevation, humidifier, lozenges, tea, popsicles, rest, etc     Discussed warning signs/symptoms indicative of need to f/u  Follow up if symptoms persist or worsen or concerns      I explained my diagnostic considerations and recommendations to the patient, who voiced understanding and agreement with the treatment plan. All questions were answered. We discussed potential side effects of any prescribed or recommended therapies, as well as expectations for response to treatments.    Disclaimer:  This note consists of words and symbols derived from keyboarding, dictation, or using voice recognition software. As a result, there may be errors in the script that have gone undetected. Please consider this when interpreting information found in this note.

## 2021-03-14 NOTE — NURSING NOTE
Patient presents to the clinic for sore throat x 1 day, swollen neck glands and body aches that started yesterday. Patient reports having a history of strep and would like a strep test. She has taken Advil for treatment.  Medication Reconciliation: complete    Christina Kent, CMA

## 2021-03-15 LAB
LABORATORY COMMENT REPORT: NORMAL
SARS-COV-2 RNA RESP QL NAA+PROBE: NEGATIVE
SARS-COV-2 RNA RESP QL NAA+PROBE: NORMAL
SPECIMEN SOURCE: NORMAL
SPECIMEN SOURCE: NORMAL

## 2021-03-16 ENCOUNTER — TELEPHONE (OUTPATIENT)
Dept: FAMILY MEDICINE | Facility: OTHER | Age: 26
End: 2021-03-16

## 2021-03-16 NOTE — TELEPHONE ENCOUNTER
Reason for call: Request for results.    Name of test or procedure: covid-19    Date of test or procedure: 3/14    Location of test or procedure: [facility]    Preferred method for responding to this message: Telephone Call    Phone number patient can be reached at: Home number on file [phone] (home)    If we can't reach you directly, may we leave a detailed response at the number you provided?Yes

## 2021-03-18 NOTE — TELEPHONE ENCOUNTER
Called and notified pt of negative covid result after confirming last name and .    Curtis Armendariz LPN .......  3/18/2021  8:27 AM

## 2021-04-25 ENCOUNTER — HEALTH MAINTENANCE LETTER (OUTPATIENT)
Age: 26
End: 2021-04-25

## 2021-10-09 ENCOUNTER — HEALTH MAINTENANCE LETTER (OUTPATIENT)
Age: 26
End: 2021-10-09

## 2022-05-21 ENCOUNTER — HEALTH MAINTENANCE LETTER (OUTPATIENT)
Age: 27
End: 2022-05-21

## 2022-09-17 ENCOUNTER — HEALTH MAINTENANCE LETTER (OUTPATIENT)
Age: 27
End: 2022-09-17

## 2022-12-20 DIAGNOSIS — R63.5 ABNORMAL WEIGHT GAIN: ICD-10-CM

## 2022-12-20 DIAGNOSIS — K59.00 CONSTIPATION: ICD-10-CM

## 2022-12-20 DIAGNOSIS — R53.83 FATIGUE: Primary | ICD-10-CM

## 2023-06-04 ENCOUNTER — HEALTH MAINTENANCE LETTER (OUTPATIENT)
Age: 28
End: 2023-06-04

## 2023-07-14 ENCOUNTER — TRANSFERRED RECORDS (OUTPATIENT)
Dept: HEALTH INFORMATION MANAGEMENT | Facility: CLINIC | Age: 28
End: 2023-07-14